# Patient Record
Sex: FEMALE | Race: WHITE | NOT HISPANIC OR LATINO | Employment: FULL TIME | URBAN - METROPOLITAN AREA
[De-identification: names, ages, dates, MRNs, and addresses within clinical notes are randomized per-mention and may not be internally consistent; named-entity substitution may affect disease eponyms.]

---

## 2023-03-29 ENCOUNTER — TELEMEDICINE (OUTPATIENT)
Dept: PRIMARY CARE | Facility: CLINIC | Age: 63
End: 2023-03-29
Payer: COMMERCIAL

## 2023-03-29 DIAGNOSIS — J20.9 ACUTE BRONCHITIS, UNSPECIFIED ORGANISM: Primary | ICD-10-CM

## 2023-03-29 PROBLEM — I10 HYPERTENSION: Status: ACTIVE | Noted: 2023-03-29

## 2023-03-29 PROBLEM — F32.A DEPRESSION: Status: ACTIVE | Noted: 2023-03-29

## 2023-03-29 PROBLEM — M54.12 CERVICAL RADICULOPATHY: Status: ACTIVE | Noted: 2023-03-29

## 2023-03-29 PROBLEM — G56.00 CARPAL TUNNEL SYNDROME: Status: ACTIVE | Noted: 2023-03-29

## 2023-03-29 PROBLEM — G62.9 PERIPHERAL NEUROPATHY: Status: ACTIVE | Noted: 2023-03-29

## 2023-03-29 PROCEDURE — 99212 OFFICE O/P EST SF 10 MIN: CPT | Performed by: NURSE PRACTITIONER

## 2023-03-29 RX ORDER — METHYLPREDNISOLONE 4 MG/1
TABLET ORAL
Qty: 21 TABLET | Refills: 0 | Status: SHIPPED | OUTPATIENT
Start: 2023-03-29 | End: 2023-04-05

## 2023-03-29 RX ORDER — ALBUTEROL SULFATE 90 UG/1
2 AEROSOL, METERED RESPIRATORY (INHALATION) EVERY 4 HOURS PRN
COMMUNITY
End: 2024-02-21

## 2023-03-29 RX ORDER — RIZATRIPTAN BENZOATE 10 MG/1
10 TABLET ORAL ONCE AS NEEDED
COMMUNITY
End: 2023-12-06

## 2023-03-29 RX ORDER — MELOXICAM 15 MG/1
15 TABLET ORAL DAILY
COMMUNITY
End: 2023-04-17

## 2023-03-29 RX ORDER — LISINOPRIL 10 MG/1
10 TABLET ORAL DAILY
COMMUNITY
End: 2023-03-31

## 2023-03-29 RX ORDER — BENZONATATE 200 MG/1
200 CAPSULE ORAL 3 TIMES DAILY PRN
Qty: 42 CAPSULE | Refills: 0 | Status: SHIPPED | OUTPATIENT
Start: 2023-03-29 | End: 2023-04-28

## 2023-03-29 RX ORDER — AZITHROMYCIN 250 MG/1
TABLET, FILM COATED ORAL
Qty: 6 TABLET | Refills: 0 | Status: SHIPPED | OUTPATIENT
Start: 2023-03-29 | End: 2023-04-03

## 2023-03-29 ASSESSMENT — ENCOUNTER SYMPTOMS
SORE THROAT: 0
SINUS PRESSURE: 0
SINUS PAIN: 0
FEVER: 0
SHORTNESS OF BREATH: 1
NAUSEA: 0
ABDOMINAL DISTENTION: 0
RHINORRHEA: 1
CHEST TIGHTNESS: 0
FATIGUE: 0
APPETITE CHANGE: 0
TROUBLE SWALLOWING: 0
PALPITATIONS: 0
VOICE CHANGE: 1
DIARRHEA: 0
COUGH: 1
STRIDOR: 0
WHEEZING: 0

## 2023-03-29 NOTE — PROGRESS NOTES
Subjective   Chief Complaint: Cough (1 week ).    Cough  Associated symptoms include rhinorrhea and shortness of breath. Pertinent negatives include no chest pain, ear pain, fever, postnasal drip, sore throat or wheezing.      Courtney Chiu is a 63 y.o. female who presents for Cough (1 week ).  Cough over the past 1 week which has gotten worse over the past few days. Has been experiencing hoarseness due to the cough. Occasionally productive but mostly dry. Keeps her up at night   Has been using clartin D with no relief. Albuterol inhaler with no relief.   Some sinus drainage  Patient denies fever, chills, nausea, vomiting, diarrhea, chest pain, heart palpations, or shortness of breath.   COVID test negative     Review of Systems   Constitutional:  Negative for appetite change, fatigue and fever.   HENT:  Positive for congestion, rhinorrhea and voice change. Negative for ear discharge, ear pain, postnasal drip, sinus pressure, sinus pain, sore throat and trouble swallowing.    Respiratory:  Positive for cough and shortness of breath. Negative for chest tightness, wheezing and stridor.    Cardiovascular:  Negative for chest pain and palpitations.   Gastrointestinal:  Negative for abdominal distention, diarrhea and nausea.       Objective   There were no vitals taken for this visit.  BSA There is no height or weight on file to calculate BSA.      Physical Exam  No visits with results within 1 Year(s) from this visit.   Latest known visit with results is:   Legacy Encounter on 12/03/2021   Component Date Value Ref Range Status    WBC 12/03/2021 5.5  4.4 - 11.3 x10E9/L Final    nRBC 12/03/2021 0.0  0.0 - 0.0 /100 WBC Final    RBC 12/03/2021 3.97 (L)  4.00 - 5.20 x10E12/L Final    Hemoglobin 12/03/2021 13.7  12.0 - 16.0 g/dL Final    Hematocrit 12/03/2021 41.4  36.0 - 46.0 % Final    MCV 12/03/2021 104 (H)  80 - 100 fL Final    MCHC 12/03/2021 33.1  32.0 - 36.0 g/dL Final    Platelets 12/03/2021 263  150 - 450 x10E9/L  Final    RDW 12/03/2021 14.1  11.5 - 14.5 % Final    Neutrophils % 12/03/2021 67.2  40.0 - 80.0 % Final    Immature Granulocytes %, Automated 12/03/2021 0.6  0.0 - 0.9 % Final    Comment:  Immature Granulocyte Count (IG) includes promyelocytes,    myelocytes and metamyelocytes but does not include bands.   Percent differential counts (%) should be interpreted in the   context of the absolute cell counts (cells/L).      Lymphocytes % 12/03/2021 16.1  13.0 - 44.0 % Final    Monocytes % 12/03/2021 9.7  2.0 - 10.0 % Final    Eosinophils % 12/03/2021 5.3  0.0 - 6.0 % Final    Basophils % 12/03/2021 1.1  0.0 - 2.0 % Final    Neutrophils Absolute 12/03/2021 3.66  1.20 - 7.70 x10E9/L Final    Lymphocytes Absolute 12/03/2021 0.88 (L)  1.20 - 4.80 x10E9/L Final    Monocytes Absolute 12/03/2021 0.53  0.10 - 1.00 x10E9/L Final    Eosinophils Absolute 12/03/2021 0.29  0.00 - 0.70 x10E9/L Final    Basophils Absolute 12/03/2021 0.06  0.00 - 0.10 x10E9/L Final    Folate 12/03/2021 13.7  >5.0 ng/mL Final    Comment: Low           <3.4  Borderline 3.4-5.0  Normal        >5.0  .   Biotin interference may cause falsely elevated results.    Patients taking a Biotin dose of up to 5 mg/day should    refrain from taking Biotin for 24 hours before sample    collection. Providers may contact their local laboratory   for further information.      Cholesterol 12/03/2021 205 (H)  0 - 199 mg/dL Final    Comment: .      AGE      DESIRABLE   BORDERLINE HIGH   HIGH     0-19 Y     0 - 169       170 - 199     >/= 200    20-24 Y     0 - 189       190 - 224     >/= 225         >24 Y     0 - 199       200 - 239     >/= 240   **All ranges are based on fasting samples. Specific   therapeutic targets will vary based on patient-specific   cardiac risk.  .   Pediatric guidelines reference:Pediatrics 2011, 128(S5).   Adult guidelines reference: NCEP ATPIII Guidelines,     NORA 2001, 258:2486-97  .   Venipuncture immediately after or during the     administration of Metamizole may lead to falsely   low results. Testing should be performed immediately   prior to Metamizole dosing.      HDL 12/03/2021 66.6  mg/dL Final    Comment: .      AGE      VERY LOW   LOW     NORMAL    HIGH       0-19 Y       < 35   < 40     40-45     ----    20-24 Y       ----   < 40       >45     ----      >24 Y       ----   < 40     40-60      >60  .      Cholesterol/HDL Ratio 12/03/2021 3.1   Final    Comment: REF VALUES  DESIRABLE  < 3.4  HIGH RISK  > 5.0      LDL 12/03/2021 112 (H)  0 - 99 mg/dL Final    Comment: .                           NEAR      BORD      AGE      DESIRABLE  OPTIMAL    HIGH     HIGH     VERY HIGH     0-19 Y     0 - 109     ---    110-129   >/= 130     ----    20-24 Y     0 - 119     ---    120-159   >/= 160     ----      >24 Y     0 -  99   100-129  130-159   160-189     >/=190  .      VLDL 12/03/2021 27  0 - 40 mg/dL Final    Triglycerides 12/03/2021 133  0 - 149 mg/dL Final    Comment: .      AGE      DESIRABLE   BORDERLINE HIGH   HIGH     VERY HIGH   0 D-90 D    19 - 174         ----         ----        ----  91 D- 9 Y     0 -  74        75 -  99     >/= 100      ----    10-19 Y     0 -  89        90 - 129     >/= 130      ----    20-24 Y     0 - 114       115 - 149     >/= 150      ----         >24 Y     0 - 149       150 - 199    200- 499    >/= 500  .   Venipuncture immediately after or during the    administration of Metamizole may lead to falsely   low results. Testing should be performed immediately   prior to Metamizole dosing.      Vitamin B-12 12/03/2021 467  211 - 911 pg/mL Final    Glucose 12/03/2021 90  74 - 99 mg/dL Final    Sodium 12/03/2021 139  136 - 145 mmol/L Final    Potassium 12/03/2021 4.4  3.5 - 5.3 mmol/L Final    Chloride 12/03/2021 102  98 - 107 mmol/L Final    Bicarbonate 12/03/2021 30  21 - 32 mmol/L Final    Anion Gap 12/03/2021 11  10 - 20 mmol/L Final    Urea Nitrogen 12/03/2021 18  6 - 23 mg/dL Final    Creatinine 12/03/2021  0.71  0.50 - 1.05 mg/dL Final    GLOMERULAR FILTRATION RATE-NON AFR* 12/03/2021 >60  >60 mL/min/1.73m2 Final    GLOMERULAR FILTRATION RATE-* 12/03/2021 >60  >60 mL/min/1.73m2 Final    Comment:  CALCULATIONS OF ESTIMATED GFR ARE PERFORMED   USING THE MDRD STUDY EQUATION FOR THE   IDMS-TRACEABLE CREATININE METHODS.   CLIN CHEM 2007;53:766-72      Calcium 12/03/2021 9.6  8.6 - 10.6 mg/dL Final    Albumin 12/03/2021 4.3  3.4 - 5.0 g/dL Final    Alkaline Phosphatase 12/03/2021 117 (H)  33 - 110 U/L Final    Total Protein 12/03/2021 7.0  6.4 - 8.2 g/dL Final    AST 12/03/2021 24  9 - 39 U/L Final    Total Bilirubin 12/03/2021 0.5  0.0 - 1.2 mg/dL Final    ALT (SGPT) 12/03/2021 29  7 - 45 U/L Final    Comment:  Patients treated with Sulfasalazine may generate    falsely decreased results for ALT.      TSH 12/03/2021 3.32  0.44 - 3.98 mIU/L Final    Comment:  TSH testing is performed using different testing    methodology at [facility] than at other    [facility]. Direct result comparisons should    only be made within the same method.       Current Outpatient Medications on File Prior to Visit   Medication Sig Dispense Refill    albuterol 90 mcg/actuation inhaler Inhale 2 puffs every 4 hours if needed.      lisinopril 10 mg tablet Take 1 tablet (10 mg) by mouth once daily.      meloxicam (Mobic) 15 mg tablet Take 1 tablet (15 mg) by mouth once daily. Take with food.      rizatriptan (Maxalt) 10 mg tablet Take 1 tablet (10 mg) by mouth 1 time if needed for migraine.       No current facility-administered medications on file prior to visit.     No images are attached to the encounter.            Assessment/Plan   Problem List Items Addressed This Visit          Respiratory    Acute bronchitis - Primary     - Antibiotic sent to pharmacy  - continue Albuterol inhaler   - Tessalon perles sent to pharmacy   - Steroid sent to pharmacy  - Advised patient to push fluids   - patient to call if develops new  or worsening symptoms           Relevant Medications    azithromycin (Zithromax) 250 mg tablet    benzonatate (Tessalon) 200 mg capsule    methylPREDNISolone (Medrol Dospak) 4 mg tablets

## 2023-03-29 NOTE — ASSESSMENT & PLAN NOTE
- Antibiotic sent to pharmacy  - continue Albuterol inhaler   - Tessalon perles sent to pharmacy   - Steroid sent to pharmacy  - Advised patient to push fluids   - patient to call if develops new or worsening symptoms

## 2023-04-15 DIAGNOSIS — M19.90 UNSPECIFIED OSTEOARTHRITIS, UNSPECIFIED SITE: ICD-10-CM

## 2023-04-17 RX ORDER — MELOXICAM 15 MG/1
TABLET ORAL
Qty: 90 TABLET | Refills: 0 | Status: SHIPPED | OUTPATIENT
Start: 2023-04-17 | End: 2023-06-28 | Stop reason: ALTCHOICE

## 2023-06-21 ENCOUNTER — OFFICE VISIT (OUTPATIENT)
Dept: PRIMARY CARE | Facility: CLINIC | Age: 63
End: 2023-06-21
Payer: COMMERCIAL

## 2023-06-21 VITALS
SYSTOLIC BLOOD PRESSURE: 116 MMHG | BODY MASS INDEX: 43.99 KG/M2 | OXYGEN SATURATION: 93 % | WEIGHT: 264.38 LBS | HEART RATE: 91 BPM | DIASTOLIC BLOOD PRESSURE: 81 MMHG

## 2023-06-21 DIAGNOSIS — M25.551 RIGHT HIP PAIN: Primary | ICD-10-CM

## 2023-06-21 DIAGNOSIS — S76.011A STRAIN OF MUSCLE OF RIGHT HIP, INITIAL ENCOUNTER: ICD-10-CM

## 2023-06-21 PROCEDURE — 3074F SYST BP LT 130 MM HG: CPT | Performed by: NURSE PRACTITIONER

## 2023-06-21 PROCEDURE — 3079F DIAST BP 80-89 MM HG: CPT | Performed by: NURSE PRACTITIONER

## 2023-06-21 PROCEDURE — 99213 OFFICE O/P EST LOW 20 MIN: CPT | Performed by: NURSE PRACTITIONER

## 2023-06-21 PROCEDURE — 1036F TOBACCO NON-USER: CPT | Performed by: NURSE PRACTITIONER

## 2023-06-21 RX ORDER — CYCLOBENZAPRINE HCL 5 MG
5 TABLET ORAL NIGHTLY PRN
Qty: 30 TABLET | Refills: 0 | Status: SHIPPED | OUTPATIENT
Start: 2023-06-21 | End: 2023-09-28

## 2023-06-21 ASSESSMENT — ENCOUNTER SYMPTOMS
FEVER: 0
VOMITING: 0
CHILLS: 0
NAUSEA: 0
COUGH: 0
ABDOMINAL PAIN: 0
SHORTNESS OF BREATH: 0
DIARRHEA: 0

## 2023-06-21 ASSESSMENT — PAIN SCALES - GENERAL: PAINLEVEL: 6

## 2023-06-21 NOTE — PATIENT INSTRUCTIONS
Obtain xray of your hip as ordered  May use Flexeril at nighttime   May continue to use Meloxicam and tyenol for pain  Please call if your symptoms worsen or do not improve

## 2023-06-21 NOTE — PROGRESS NOTES
Subjective   Chief Complaint: Back Pain and Hip Pain (Right. 1 week).    HPI   Courtney Chiu is a 63 y.o. female who presents for Back Pain and Hip Pain (Right. 1 week).    Patient presents with right hip pain that started 1 week ago,   She has had this in the past but never this bad or lasted this long.   Pain is worse when walking and moving   She reports that the pain is her hip and radiates to part of her thigh. She reports this feels different than sciatic pain she has had in the past.     She tried taking tylenol with minimal relief.   She is currently on Meloxicam     Patient denies fever, chills, nausea, vomiting, diarrhea, chest pain, heart palpations, or shortness of breath.       Review of Systems   Constitutional:  Negative for chills and fever.   Respiratory:  Negative for cough and shortness of breath.    Cardiovascular:  Negative for chest pain.   Gastrointestinal:  Negative for abdominal pain, diarrhea, nausea and vomiting.   Musculoskeletal:         Right hip pain        Objective   /81   Pulse 91   Wt 120 kg (264 lb 6 oz)   SpO2 93%   BMI 43.99 kg/m²   BSA Body surface area is 2.35 meters squared.      Physical Exam  Constitutional:       Appearance: Normal appearance.   Musculoskeletal:         General: No swelling, tenderness, deformity or signs of injury.      Comments: Right hip pain    Neurological:      Mental Status: She is alert.       No visits with results within 1 Year(s) from this visit.   Latest known visit with results is:   Legacy Encounter on 12/03/2021   Component Date Value Ref Range Status    WBC 12/03/2021 5.5  4.4 - 11.3 x10E9/L Final    nRBC 12/03/2021 0.0  0.0 - 0.0 /100 WBC Final    RBC 12/03/2021 3.97 (L)  4.00 - 5.20 x10E12/L Final    Hemoglobin 12/03/2021 13.7  12.0 - 16.0 g/dL Final    Hematocrit 12/03/2021 41.4  36.0 - 46.0 % Final    MCV 12/03/2021 104 (H)  80 - 100 fL Final    MCHC 12/03/2021 33.1  32.0 - 36.0 g/dL Final    Platelets 12/03/2021 263  150  - 450 x10E9/L Final    RDW 12/03/2021 14.1  11.5 - 14.5 % Final    Neutrophils % 12/03/2021 67.2  40.0 - 80.0 % Final    Immature Granulocytes %, Automated 12/03/2021 0.6  0.0 - 0.9 % Final    Comment:  Immature Granulocyte Count (IG) includes promyelocytes,    myelocytes and metamyelocytes but does not include bands.   Percent differential counts (%) should be interpreted in the   context of the absolute cell counts (cells/L).      Lymphocytes % 12/03/2021 16.1  13.0 - 44.0 % Final    Monocytes % 12/03/2021 9.7  2.0 - 10.0 % Final    Eosinophils % 12/03/2021 5.3  0.0 - 6.0 % Final    Basophils % 12/03/2021 1.1  0.0 - 2.0 % Final    Neutrophils Absolute 12/03/2021 3.66  1.20 - 7.70 x10E9/L Final    Lymphocytes Absolute 12/03/2021 0.88 (L)  1.20 - 4.80 x10E9/L Final    Monocytes Absolute 12/03/2021 0.53  0.10 - 1.00 x10E9/L Final    Eosinophils Absolute 12/03/2021 0.29  0.00 - 0.70 x10E9/L Final    Basophils Absolute 12/03/2021 0.06  0.00 - 0.10 x10E9/L Final    Folate 12/03/2021 13.7  >5.0 ng/mL Final    Comment: Low           <3.4  Borderline 3.4-5.0  Normal        >5.0  .   Biotin interference may cause falsely elevated results.    Patients taking a Biotin dose of up to 5 mg/day should    refrain from taking Biotin for 24 hours before sample    collection. Providers may contact their local laboratory   for further information.      Cholesterol 12/03/2021 205 (H)  0 - 199 mg/dL Final    Comment: .      AGE      DESIRABLE   BORDERLINE HIGH   HIGH     0-19 Y     0 - 169       170 - 199     >/= 200    20-24 Y     0 - 189       190 - 224     >/= 225         >24 Y     0 - 199       200 - 239     >/= 240   **All ranges are based on fasting samples. Specific   therapeutic targets will vary based on patient-specific   cardiac risk.  .   Pediatric guidelines reference:Pediatrics 2011, 128(S5).   Adult guidelines reference: NCEP ATPIII Guidelines,     NORA 2001, 258:2486-97  .   Venipuncture immediately after or during the     administration of Metamizole may lead to falsely   low results. Testing should be performed immediately   prior to Metamizole dosing.      HDL 12/03/2021 66.6  mg/dL Final    Comment: .      AGE      VERY LOW   LOW     NORMAL    HIGH       0-19 Y       < 35   < 40     40-45     ----    20-24 Y       ----   < 40       >45     ----      >24 Y       ----   < 40     40-60      >60  .      Cholesterol/HDL Ratio 12/03/2021 3.1   Final    Comment: REF VALUES  DESIRABLE  < 3.4  HIGH RISK  > 5.0      LDL 12/03/2021 112 (H)  0 - 99 mg/dL Final    Comment: .                           NEAR      BORD      AGE      DESIRABLE  OPTIMAL    HIGH     HIGH     VERY HIGH     0-19 Y     0 - 109     ---    110-129   >/= 130     ----    20-24 Y     0 - 119     ---    120-159   >/= 160     ----      >24 Y     0 -  99   100-129  130-159   160-189     >/=190  .      VLDL 12/03/2021 27  0 - 40 mg/dL Final    Triglycerides 12/03/2021 133  0 - 149 mg/dL Final    Comment: .      AGE      DESIRABLE   BORDERLINE HIGH   HIGH     VERY HIGH   0 D-90 D    19 - 174         ----         ----        ----  91 D- 9 Y     0 -  74        75 -  99     >/= 100      ----    10-19 Y     0 -  89        90 - 129     >/= 130      ----    20-24 Y     0 - 114       115 - 149     >/= 150      ----         >24 Y     0 - 149       150 - 199    200- 499    >/= 500  .   Venipuncture immediately after or during the    administration of Metamizole may lead to falsely   low results. Testing should be performed immediately   prior to Metamizole dosing.      Vitamin B-12 12/03/2021 467  211 - 911 pg/mL Final    Glucose 12/03/2021 90  74 - 99 mg/dL Final    Sodium 12/03/2021 139  136 - 145 mmol/L Final    Potassium 12/03/2021 4.4  3.5 - 5.3 mmol/L Final    Chloride 12/03/2021 102  98 - 107 mmol/L Final    Bicarbonate 12/03/2021 30  21 - 32 mmol/L Final    Anion Gap 12/03/2021 11  10 - 20 mmol/L Final    Urea Nitrogen 12/03/2021 18  6 - 23 mg/dL Final    Creatinine 12/03/2021  0.71  0.50 - 1.05 mg/dL Final    GLOMERULAR FILTRATION RATE-NON AFR* 12/03/2021 >60  >60 mL/min/1.73m2 Final    GLOMERULAR FILTRATION RATE-* 12/03/2021 >60  >60 mL/min/1.73m2 Final    Comment:  CALCULATIONS OF ESTIMATED GFR ARE PERFORMED   USING THE MDRD STUDY EQUATION FOR THE   IDMS-TRACEABLE CREATININE METHODS.   CLIN CHEM 2007;53:766-72      Calcium 12/03/2021 9.6  8.6 - 10.6 mg/dL Final    Albumin 12/03/2021 4.3  3.4 - 5.0 g/dL Final    Alkaline Phosphatase 12/03/2021 117 (H)  33 - 110 U/L Final    Total Protein 12/03/2021 7.0  6.4 - 8.2 g/dL Final    AST 12/03/2021 24  9 - 39 U/L Final    Total Bilirubin 12/03/2021 0.5  0.0 - 1.2 mg/dL Final    ALT (SGPT) 12/03/2021 29  7 - 45 U/L Final    Comment:  Patients treated with Sulfasalazine may generate    falsely decreased results for ALT.      TSH 12/03/2021 3.32  0.44 - 3.98 mIU/L Final    Comment:  TSH testing is performed using different testing    methodology at [facility] than at other    [facility] hospitals. Direct result comparisons should    only be made within the same method.       Current Outpatient Medications on File Prior to Visit   Medication Sig Dispense Refill    albuterol 90 mcg/actuation inhaler Inhale 2 puffs every 4 hours if needed.      lisinopril 10 mg tablet Take 1 tablet (10 mg) by mouth once daily. 90 tablet 0    meloxicam (Mobic) 15 mg tablet TAKE 1 TABLET BY MOUTH EVERY DAY WITH FOOD 90 tablet 0    rizatriptan (Maxalt) 10 mg tablet Take 1 tablet (10 mg) by mouth 1 time if needed for migraine.       No current facility-administered medications on file prior to visit.     No images are attached to the encounter.            Assessment/Plan   Problem List Items Addressed This Visit       Right hip pain - Primary     Xray ordered           Other Visit Diagnoses       Strain of muscle of right hip, initial encounter        Relevant Medications    cyclobenzaprine (Flexeril) 5 mg tablet

## 2023-06-26 DIAGNOSIS — I10 ESSENTIAL (PRIMARY) HYPERTENSION: ICD-10-CM

## 2023-06-26 RX ORDER — LISINOPRIL 10 MG/1
TABLET ORAL
Qty: 90 TABLET | Refills: 0 | Status: SHIPPED | OUTPATIENT
Start: 2023-06-26 | End: 2023-09-28

## 2023-06-28 ENCOUNTER — OFFICE VISIT (OUTPATIENT)
Dept: PRIMARY CARE | Facility: CLINIC | Age: 63
End: 2023-06-28
Payer: COMMERCIAL

## 2023-06-28 VITALS
WEIGHT: 265 LBS | BODY MASS INDEX: 44.15 KG/M2 | HEIGHT: 65 IN | SYSTOLIC BLOOD PRESSURE: 128 MMHG | DIASTOLIC BLOOD PRESSURE: 88 MMHG | HEART RATE: 95 BPM | RESPIRATION RATE: 18 BRPM | OXYGEN SATURATION: 94 %

## 2023-06-28 DIAGNOSIS — M25.551 RIGHT HIP PAIN: Primary | ICD-10-CM

## 2023-06-28 DIAGNOSIS — G56.03 BILATERAL CARPAL TUNNEL SYNDROME: ICD-10-CM

## 2023-06-28 PROCEDURE — 1036F TOBACCO NON-USER: CPT | Performed by: FAMILY MEDICINE

## 2023-06-28 PROCEDURE — 99213 OFFICE O/P EST LOW 20 MIN: CPT | Performed by: FAMILY MEDICINE

## 2023-06-28 PROCEDURE — 3079F DIAST BP 80-89 MM HG: CPT | Performed by: FAMILY MEDICINE

## 2023-06-28 PROCEDURE — 3074F SYST BP LT 130 MM HG: CPT | Performed by: FAMILY MEDICINE

## 2023-06-28 RX ORDER — METHYLPREDNISOLONE 4 MG/1
TABLET ORAL
Qty: 21 TABLET | Refills: 0 | Status: SHIPPED | OUTPATIENT
Start: 2023-06-28 | End: 2023-07-05

## 2023-06-28 ASSESSMENT — ENCOUNTER SYMPTOMS
MYALGIAS: 0
FATIGUE: 0
ENDOCRINE NEGATIVE: 1
UNEXPECTED WEIGHT CHANGE: 0
DIZZINESS: 1
HIP PAIN: 1
ARTHRALGIAS: 1
RESPIRATORY NEGATIVE: 1
DIAPHORESIS: 0

## 2023-06-28 ASSESSMENT — PAIN SCALES - GENERAL: PAINLEVEL: 6

## 2023-06-28 NOTE — PATIENT INSTRUCTIONS
Evaluating for persistent hip pain.    Ordering MRI of the hip.    Starting Medrol Dosepak.    Please hold meloxicam at this time.    ER if unable to bear weight on this area.

## 2023-06-28 NOTE — PROGRESS NOTES
"Subjective   Patient ID: Courtney Chiu is a 63 y.o. female who presents for Hip Pain (Pain not improving - bruise developed around hip line).    Hip Pain      patient presents with hip pain.  Patient having excruciating pain discomfort in the right hip.  Very difficult time walking on the area.  Pain is into the groin area.  Patient did have x-ray performed no evidence of fracture was appreciated.    Patient now noticed a large area of bruising in the posterior aspect of the hip area.    Pain is not shooting down below the knee no numbness no tingling into the foot.    States that after walking 10 steps its excruciating pain and can barely proceed moving forward.    Patient's been taking anti-inflammatory muscle relaxant which provides very minimal disc comfort relief.    Wednesday worse.    Limping    Last weekend started.  Some bother.    Review of Systems   Constitutional:  Negative for diaphoresis, fatigue and unexpected weight change.   Respiratory: Negative.     Endocrine: Negative.    Musculoskeletal:  Positive for arthralgias. Negative for myalgias.   Skin:  Positive for rash.   Neurological:  Positive for dizziness.       Objective   /88 (BP Location: Right arm, Patient Position: Sitting, BP Cuff Size: Large adult)   Pulse 95   Resp 18   Ht 1.651 m (5' 5\")   Wt 120 kg (265 lb)   SpO2 94%   BMI 44.10 kg/m²   BSA Body surface area is 2.35 meters squared.      Physical Exam  Constitutional:       Appearance: Normal appearance.   Eyes:      Pupils: Pupils are equal, round, and reactive to light.   Cardiovascular:      Rate and Rhythm: Normal rate and regular rhythm.      Pulses: Normal pulses.      Heart sounds: Normal heart sounds.   Pulmonary:      Effort: Pulmonary effort is normal.   Neurological:      Mental Status: She is alert.     Patient having difficult time bearing weight on the right hip.    Some pain with flexion of the hip also with internal and external rotation.  No leg length " discrepancy is noted.    There is area of ecchymosis posterior aspect of the greater trochanteric area on the right side.    No evidence of hematoma noted.    Patient once again denies any trauma to the area there was no sudden movement.  No visits with results within 1 Year(s) from this visit.   Latest known visit with results is:   [facility] Encounter on 12/03/2021   Component Date Value Ref Range Status    WBC 12/03/2021 5.5  4.4 - 11.3 x10E9/L Final    nRBC 12/03/2021 0.0  0.0 - 0.0 /100 WBC Final    RBC 12/03/2021 3.97 (L)  4.00 - 5.20 x10E12/L Final    Hemoglobin 12/03/2021 13.7  12.0 - 16.0 g/dL Final    Hematocrit 12/03/2021 41.4  36.0 - 46.0 % Final    MCV 12/03/2021 104 (H)  80 - 100 fL Final    MCHC 12/03/2021 33.1  32.0 - 36.0 g/dL Final    Platelets 12/03/2021 263  150 - 450 x10E9/L Final    RDW 12/03/2021 14.1  11.5 - 14.5 % Final    Neutrophils % 12/03/2021 67.2  40.0 - 80.0 % Final    Immature Granulocytes %, Automated 12/03/2021 0.6  0.0 - 0.9 % Final    Comment:  Immature Granulocyte Count (IG) includes promyelocytes,    myelocytes and metamyelocytes but does not include bands.   Percent differential counts (%) should be interpreted in the   context of the absolute cell counts (cells/L).      Lymphocytes % 12/03/2021 16.1  13.0 - 44.0 % Final    Monocytes % 12/03/2021 9.7  2.0 - 10.0 % Final    Eosinophils % 12/03/2021 5.3  0.0 - 6.0 % Final    Basophils % 12/03/2021 1.1  0.0 - 2.0 % Final    Neutrophils Absolute 12/03/2021 3.66  1.20 - 7.70 x10E9/L Final    Lymphocytes Absolute 12/03/2021 0.88 (L)  1.20 - 4.80 x10E9/L Final    Monocytes Absolute 12/03/2021 0.53  0.10 - 1.00 x10E9/L Final    Eosinophils Absolute 12/03/2021 0.29  0.00 - 0.70 x10E9/L Final    Basophils Absolute 12/03/2021 0.06  0.00 - 0.10 x10E9/L Final    Folate 12/03/2021 13.7  >5.0 ng/mL Final    Comment: Low           <3.4  Borderline 3.4-5.0  Normal        >5.0  .   Biotin interference may cause falsely elevated results.     Patients taking a Biotin dose of up to 5 mg/day should    refrain from taking Biotin for 24 hours before sample    collection. Providers may contact their local laboratory   for further information.      Cholesterol 12/03/2021 205 (H)  0 - 199 mg/dL Final    Comment: .      AGE      DESIRABLE   BORDERLINE HIGH   HIGH     0-19 Y     0 - 169       170 - 199     >/= 200    20-24 Y     0 - 189       190 - 224     >/= 225         >24 Y     0 - 199       200 - 239     >/= 240   **All ranges are based on fasting samples. Specific   therapeutic targets will vary based on patient-specific   cardiac risk.  .   Pediatric guidelines reference:Pediatrics 2011, 128(S5).   Adult guidelines reference: NCEP ATPIII Guidelines,     NORA 2001, 258:2486-97  .   Venipuncture immediately after or during the    administration of Metamizole may lead to falsely   low results. Testing should be performed immediately   prior to Metamizole dosing.      HDL 12/03/2021 66.6  mg/dL Final    Comment: .      AGE      VERY LOW   LOW     NORMAL    HIGH       0-19 Y       < 35   < 40     40-45     ----    20-24 Y       ----   < 40       >45     ----      >24 Y       ----   < 40     40-60      >60  .      Cholesterol/HDL Ratio 12/03/2021 3.1   Final    Comment: REF VALUES  DESIRABLE  < 3.4  HIGH RISK  > 5.0      LDL 12/03/2021 112 (H)  0 - 99 mg/dL Final    Comment: .                           NEAR      BORD      AGE      DESIRABLE  OPTIMAL    HIGH     HIGH     VERY HIGH     0-19 Y     0 - 109     ---    110-129   >/= 130     ----    20-24 Y     0 - 119     ---    120-159   >/= 160     ----      >24 Y     0 -  99   100-129  130-159   160-189     >/=190  .      VLDL 12/03/2021 27  0 - 40 mg/dL Final    Triglycerides 12/03/2021 133  0 - 149 mg/dL Final    Comment: .      AGE      DESIRABLE   BORDERLINE HIGH   HIGH     VERY HIGH   0 D-90 D    19 - 174         ----         ----        ----  91 D- 9 Y     0 -  74        75 -  99     >/= 100      ----     10-19 Y     0 -  89        90 - 129     >/= 130      ----    20-24 Y     0 - 114       115 - 149     >/= 150      ----         >24 Y     0 - 149       150 - 199    200- 499    >/= 500  .   Venipuncture immediately after or during the    administration of Metamizole may lead to falsely   low results. Testing should be performed immediately   prior to Metamizole dosing.      Vitamin B-12 12/03/2021 467  211 - 911 pg/mL Final    Glucose 12/03/2021 90  74 - 99 mg/dL Final    Sodium 12/03/2021 139  136 - 145 mmol/L Final    Potassium 12/03/2021 4.4  3.5 - 5.3 mmol/L Final    Chloride 12/03/2021 102  98 - 107 mmol/L Final    Bicarbonate 12/03/2021 30  21 - 32 mmol/L Final    Anion Gap 12/03/2021 11  10 - 20 mmol/L Final    Urea Nitrogen 12/03/2021 18  6 - 23 mg/dL Final    Creatinine 12/03/2021 0.71  0.50 - 1.05 mg/dL Final    GLOMERULAR FILTRATION RATE-NON AFR* 12/03/2021 >60  >60 mL/min/1.73m2 Final    GLOMERULAR FILTRATION RATE-* 12/03/2021 >60  >60 mL/min/1.73m2 Final    Comment:  CALCULATIONS OF ESTIMATED GFR ARE PERFORMED   USING THE MDRD STUDY EQUATION FOR THE   IDMS-TRACEABLE CREATININE METHODS.   CLIN CHEM 2007;53:766-72      Calcium 12/03/2021 9.6  8.6 - 10.6 mg/dL Final    Albumin 12/03/2021 4.3  3.4 - 5.0 g/dL Final    Alkaline Phosphatase 12/03/2021 117 (H)  33 - 110 U/L Final    Total Protein 12/03/2021 7.0  6.4 - 8.2 g/dL Final    AST 12/03/2021 24  9 - 39 U/L Final    Total Bilirubin 12/03/2021 0.5  0.0 - 1.2 mg/dL Final    ALT (SGPT) 12/03/2021 29  7 - 45 U/L Final    Comment:  Patients treated with Sulfasalazine may generate    falsely decreased results for ALT.      TSH 12/03/2021 3.32  0.44 - 3.98 mIU/L Final    Comment:  TSH testing is performed using different testing    methodology at [facility] than at other    [facility]. Direct result comparisons should    only be made within the same method.       Current Outpatient Medications on File Prior to Visit   Medication Sig  Dispense Refill    albuterol 90 mcg/actuation inhaler Inhale 2 puffs every 4 hours if needed.      cyclobenzaprine (Flexeril) 5 mg tablet Take 1 tablet (5 mg) by mouth as needed at bedtime for muscle spasms. 30 tablet 0    lisinopril 10 mg tablet TAKE 1 TABLET BY MOUTH EVERY DAY 90 tablet 0    meloxicam (Mobic) 15 mg tablet TAKE 1 TABLET BY MOUTH EVERY DAY WITH FOOD 90 tablet 0    rizatriptan (Maxalt) 10 mg tablet Take 1 tablet (10 mg) by mouth 1 time if needed for migraine.      [DISCONTINUED] lisinopril 10 mg tablet Take 1 tablet (10 mg) by mouth once daily. 90 tablet 0     No current facility-administered medications on file prior to visit.     No images are attached to the encounter.            Assessment/Plan   Problem List Items Addressed This Visit       Right hip pain - Primary    Relevant Medications    methylPREDNISolone (Medrol Dospak) 4 mg tablets

## 2023-06-29 ENCOUNTER — TELEPHONE (OUTPATIENT)
Dept: PRIMARY CARE | Facility: CLINIC | Age: 63
End: 2023-06-29
Payer: COMMERCIAL

## 2023-06-29 NOTE — TELEPHONE ENCOUNTER
Makayla from [employer] called asking for PA for pt's stat MRI. Please call her back.  Thank you

## 2023-06-29 NOTE — TELEPHONE ENCOUNTER
Please call Makayla and let her know that Dr. Tenorio has a peer to peer scheduled at 12:45 today for this.

## 2023-06-30 DIAGNOSIS — R59.9 LYMPH NODE ENLARGEMENT: ICD-10-CM

## 2023-07-27 DIAGNOSIS — M25.551 RIGHT HIP PAIN: Primary | ICD-10-CM

## 2023-07-27 RX ORDER — MELOXICAM 15 MG/1
15 TABLET ORAL DAILY
Qty: 90 TABLET | Refills: 0 | Status: SHIPPED | OUTPATIENT
Start: 2023-07-27 | End: 2023-10-24

## 2023-09-27 DIAGNOSIS — S76.011A STRAIN OF MUSCLE OF RIGHT HIP, INITIAL ENCOUNTER: ICD-10-CM

## 2023-09-27 DIAGNOSIS — I10 ESSENTIAL (PRIMARY) HYPERTENSION: ICD-10-CM

## 2023-09-28 RX ORDER — LISINOPRIL 10 MG/1
TABLET ORAL
Qty: 90 TABLET | Refills: 0 | Status: SHIPPED | OUTPATIENT
Start: 2023-09-28 | End: 2023-12-28

## 2023-09-28 RX ORDER — CYCLOBENZAPRINE HCL 5 MG
5 TABLET ORAL NIGHTLY PRN
Qty: 30 TABLET | Refills: 0 | Status: SHIPPED | OUTPATIENT
Start: 2023-09-28 | End: 2024-01-16

## 2023-10-24 DIAGNOSIS — M25.551 RIGHT HIP PAIN: ICD-10-CM

## 2023-10-24 RX ORDER — MELOXICAM 15 MG/1
15 TABLET ORAL DAILY
Qty: 90 TABLET | Refills: 0 | Status: SHIPPED | OUTPATIENT
Start: 2023-10-24 | End: 2024-01-16

## 2023-12-06 DIAGNOSIS — G43.909 MIGRAINE, UNSPECIFIED, NOT INTRACTABLE, WITHOUT STATUS MIGRAINOSUS: ICD-10-CM

## 2023-12-06 RX ORDER — RIZATRIPTAN BENZOATE 10 MG/1
TABLET ORAL
Qty: 12 TABLET | Refills: 0 | Status: SHIPPED | OUTPATIENT
Start: 2023-12-06 | End: 2024-01-03

## 2023-12-11 ENCOUNTER — TELEPHONE (OUTPATIENT)
Dept: PRIMARY CARE | Facility: CLINIC | Age: 63
End: 2023-12-11
Payer: COMMERCIAL

## 2023-12-11 DIAGNOSIS — R59.9 SWELLING OF LYMPH NODES: ICD-10-CM

## 2023-12-11 NOTE — TELEPHONE ENCOUNTER
[facility] calling about patients CT that was ordered on 6/30/2023. They said that the information provided warrants a CT ab/pelvis with contrast, instead of without. How would you like to proceed?

## 2023-12-12 NOTE — TELEPHONE ENCOUNTER
Pt informed. She would like to go to [facility] and an order was faxed there. Pt will go downstairs for the blood test.

## 2023-12-12 NOTE — TELEPHONE ENCOUNTER
Ct abd pelvis approved through 2/4/2024. Pt needs to have BMP done before CT is done. Please notify patient.    Chris Galvan CMA  12/12/2023  Practice Supervisor  [facility]

## 2023-12-28 DIAGNOSIS — I10 ESSENTIAL (PRIMARY) HYPERTENSION: ICD-10-CM

## 2023-12-28 RX ORDER — LISINOPRIL 10 MG/1
TABLET ORAL
Qty: 90 TABLET | Refills: 0 | Status: SHIPPED | OUTPATIENT
Start: 2023-12-28 | End: 2024-03-29

## 2024-01-03 DIAGNOSIS — G43.909 MIGRAINE, UNSPECIFIED, NOT INTRACTABLE, WITHOUT STATUS MIGRAINOSUS: ICD-10-CM

## 2024-01-03 RX ORDER — RIZATRIPTAN BENZOATE 10 MG/1
TABLET ORAL
Qty: 12 TABLET | Refills: 1 | Status: SHIPPED | OUTPATIENT
Start: 2024-01-03 | End: 2024-03-20

## 2024-01-16 DIAGNOSIS — M25.551 RIGHT HIP PAIN: ICD-10-CM

## 2024-01-16 DIAGNOSIS — S76.011A STRAIN OF MUSCLE OF RIGHT HIP, INITIAL ENCOUNTER: ICD-10-CM

## 2024-01-16 RX ORDER — CYCLOBENZAPRINE HCL 5 MG
5 TABLET ORAL NIGHTLY PRN
Qty: 30 TABLET | Refills: 0 | Status: SHIPPED | OUTPATIENT
Start: 2024-01-16 | End: 2024-03-04

## 2024-01-16 RX ORDER — MELOXICAM 15 MG/1
15 TABLET ORAL DAILY
Qty: 90 TABLET | Refills: 0 | Status: SHIPPED | OUTPATIENT
Start: 2024-01-16 | End: 2024-04-18

## 2024-02-06 ENCOUNTER — LAB (OUTPATIENT)
Dept: LAB | Facility: LAB | Age: 64
End: 2024-02-06
Payer: COMMERCIAL

## 2024-02-06 DIAGNOSIS — R59.9 SWELLING OF LYMPH NODES: ICD-10-CM

## 2024-02-06 PROCEDURE — 80048 BASIC METABOLIC PNL TOTAL CA: CPT

## 2024-02-06 PROCEDURE — 36415 COLL VENOUS BLD VENIPUNCTURE: CPT

## 2024-02-07 LAB
ANION GAP SERPL CALC-SCNC: 13 MMOL/L (ref 10–20)
BUN SERPL-MCNC: 21 MG/DL (ref 6–23)
CALCIUM SERPL-MCNC: 9.4 MG/DL (ref 8.6–10.6)
CHLORIDE SERPL-SCNC: 103 MMOL/L (ref 98–107)
CO2 SERPL-SCNC: 28 MMOL/L (ref 21–32)
CREAT SERPL-MCNC: 0.8 MG/DL (ref 0.5–1.05)
EGFRCR SERPLBLD CKD-EPI 2021: 84 ML/MIN/1.73M*2
GLUCOSE SERPL-MCNC: 100 MG/DL (ref 74–99)
POTASSIUM SERPL-SCNC: 4.1 MMOL/L (ref 3.5–5.3)
SODIUM SERPL-SCNC: 140 MMOL/L (ref 136–145)

## 2024-02-09 DIAGNOSIS — Z91.041 ALLERGY TO INTRAVENOUS CONTRAST: Primary | ICD-10-CM

## 2024-02-09 RX ORDER — PREDNISONE 50 MG/1
TABLET ORAL
Qty: 3 TABLET | Refills: 0 | Status: SHIPPED | OUTPATIENT
Start: 2024-02-09

## 2024-02-14 ENCOUNTER — HOSPITAL ENCOUNTER (OUTPATIENT)
Dept: RADIOLOGY | Facility: CLINIC | Age: 64
Discharge: HOME | End: 2024-02-14
Payer: COMMERCIAL

## 2024-02-14 DIAGNOSIS — R59.9 SWELLING OF LYMPH NODES: ICD-10-CM

## 2024-02-14 PROCEDURE — 2550000001 HC RX 255 CONTRASTS: Performed by: FAMILY MEDICINE

## 2024-02-14 PROCEDURE — 74177 CT ABD & PELVIS W/CONTRAST: CPT

## 2024-02-14 PROCEDURE — 74177 CT ABD & PELVIS W/CONTRAST: CPT | Performed by: RADIOLOGY

## 2024-02-14 RX ADMIN — IOHEXOL 75 ML: 350 INJECTION, SOLUTION INTRAVENOUS at 09:04

## 2024-02-21 DIAGNOSIS — J40 BRONCHITIS, NOT SPECIFIED AS ACUTE OR CHRONIC: ICD-10-CM

## 2024-02-21 RX ORDER — ALBUTEROL SULFATE 90 UG/1
AEROSOL, METERED RESPIRATORY (INHALATION)
Qty: 6.7 G | Refills: 0 | Status: SHIPPED | OUTPATIENT
Start: 2024-02-21 | End: 2024-03-20

## 2024-02-27 ENCOUNTER — OFFICE VISIT (OUTPATIENT)
Dept: PRIMARY CARE | Facility: CLINIC | Age: 64
End: 2024-02-27
Payer: COMMERCIAL

## 2024-02-27 VITALS
BODY MASS INDEX: 44.98 KG/M2 | HEART RATE: 92 BPM | SYSTOLIC BLOOD PRESSURE: 126 MMHG | TEMPERATURE: 97 F | HEIGHT: 65 IN | OXYGEN SATURATION: 95 % | WEIGHT: 270 LBS | DIASTOLIC BLOOD PRESSURE: 82 MMHG

## 2024-02-27 DIAGNOSIS — R59.1 LYMPHADENOPATHY: ICD-10-CM

## 2024-02-27 DIAGNOSIS — M17.0 PRIMARY OSTEOARTHRITIS OF BOTH KNEES: ICD-10-CM

## 2024-02-27 DIAGNOSIS — M25.551 RIGHT HIP PAIN: ICD-10-CM

## 2024-02-27 DIAGNOSIS — R71.8 ELEVATED MCV: ICD-10-CM

## 2024-02-27 DIAGNOSIS — M25.50 ARTHRALGIA, UNSPECIFIED JOINT: Primary | ICD-10-CM

## 2024-02-27 DIAGNOSIS — E66.01 CLASS 3 SEVERE OBESITY WITH SERIOUS COMORBIDITY AND BODY MASS INDEX (BMI) OF 40.0 TO 44.9 IN ADULT, UNSPECIFIED OBESITY TYPE (MULTI): ICD-10-CM

## 2024-02-27 PROBLEM — E66.9 OBESITY: Status: ACTIVE | Noted: 2024-02-27

## 2024-02-27 PROBLEM — J20.9 ACUTE BRONCHITIS: Status: RESOLVED | Noted: 2023-03-29 | Resolved: 2024-02-27

## 2024-02-27 PROCEDURE — 3074F SYST BP LT 130 MM HG: CPT | Performed by: FAMILY MEDICINE

## 2024-02-27 PROCEDURE — 3079F DIAST BP 80-89 MM HG: CPT | Performed by: FAMILY MEDICINE

## 2024-02-27 PROCEDURE — 1036F TOBACCO NON-USER: CPT | Performed by: FAMILY MEDICINE

## 2024-02-27 PROCEDURE — 3008F BODY MASS INDEX DOCD: CPT | Performed by: FAMILY MEDICINE

## 2024-02-27 PROCEDURE — 99214 OFFICE O/P EST MOD 30 MIN: CPT | Performed by: FAMILY MEDICINE

## 2024-02-27 ASSESSMENT — ENCOUNTER SYMPTOMS
CHILLS: 0
OCCASIONAL FEELINGS OF UNSTEADINESS: 0
FATIGUE: 0
WHEEZING: 0
EYE ITCHING: 0
CHEST TIGHTNESS: 0
LOSS OF SENSATION IN FEET: 0
FEVER: 0
DEPRESSION: 0
ACTIVITY CHANGE: 0
COUGH: 0
EYE REDNESS: 0

## 2024-02-27 ASSESSMENT — PATIENT HEALTH QUESTIONNAIRE - PHQ9
2. FEELING DOWN, DEPRESSED OR HOPELESS: NOT AT ALL
SUM OF ALL RESPONSES TO PHQ9 QUESTIONS 1 AND 2: 0
1. LITTLE INTEREST OR PLEASURE IN DOING THINGS: NOT AT ALL
10. IF YOU CHECKED OFF ANY PROBLEMS, HOW DIFFICULT HAVE THESE PROBLEMS MADE IT FOR YOU TO DO YOUR WORK, TAKE CARE OF THINGS AT HOME, OR GET ALONG WITH OTHER PEOPLE: NOT DIFFICULT AT ALL

## 2024-02-27 NOTE — PATIENT INSTRUCTIONS
Lab studies are being performed.    Working up for arthralgias.    Going to have you see hematology oncology specialist because of the borderline lymphadenopathy    LDH CBC and sed rate and C-reactive protein being performed.    Awaiting studies may reach out to pharmacy staff to see if there is medicine that could help with the weight loss.

## 2024-02-27 NOTE — ASSESSMENT & PLAN NOTE
Borderline lymphadenopathy going to have you see the hematology specialist.    Sed rate C-reactive protein go to be performed CMP CBC LDH going to be performed.  Because of the arthralgias and a rheumatoid factor being performed

## 2024-02-27 NOTE — PROGRESS NOTES
"Subjective   Patient ID: Courtney Chiu is a 64 y.o. female who presents for Follow-up (CT).    Tired and hurts every wear.  Patient states she just hurts all over.  Feels like she is having a lot of arthritis issues.  The only thing that is really helped has been the meloxicam she would like to continue on this.  Patient had no fever no chills no night sweats she has not not noticed any enlargement of lymph nodes.  She had no nausea no vomiting.    She had had some fatigue she is frustrated with her weight gain.  Said difficult time losing weight.         patient presents for follow-up on her CT scan    Review of Systems   Constitutional:  Negative for activity change, chills, fatigue and fever.   HENT:  Negative for congestion and dental problem.    Eyes:  Negative for redness and itching.   Respiratory:  Negative for cough, chest tightness and wheezing.    Endocrine: Negative for cold intolerance.       Objective   BP (!) 128/92   Pulse 92   Temp 36.1 °C (97 °F)   Ht 1.651 m (5' 5\")   Wt 122 kg (270 lb)   SpO2 95%   BMI 44.93 kg/m²   BSA Body surface area is 2.37 meters squared.      Physical Exam  Constitutional:       Appearance: Normal appearance.   HENT:      Head: Normocephalic and atraumatic.      Right Ear: Tympanic membrane normal.   Cardiovascular:      Rate and Rhythm: Normal rate and regular rhythm.      Pulses: Normal pulses.   Pulmonary:      Effort: No respiratory distress.      Breath sounds: No wheezing.   Abdominal:      General: Abdomen is flat.   Neurological:      Mental Status: She is alert.     No significant lymphadenopathy noted.  No axillary or supraclavicular lymphadenopathy.  No inguinal adenopathy.    Some pain and discomfort with testing range of motion of the joints  Lab on 02/06/2024   Component Date Value Ref Range Status    Glucose 02/06/2024 100 (H)  74 - 99 mg/dL Final    Sodium 02/06/2024 140  136 - 145 mmol/L Final    Potassium 02/06/2024 4.1  3.5 - 5.3 mmol/L Final "    Chloride 02/06/2024 103  98 - 107 mmol/L Final    Bicarbonate 02/06/2024 28  21 - 32 mmol/L Final    Anion Gap 02/06/2024 13  10 - 20 mmol/L Final    Urea Nitrogen 02/06/2024 21  6 - 23 mg/dL Final    Creatinine 02/06/2024 0.80  0.50 - 1.05 mg/dL Final    eGFR 02/06/2024 84  >60 mL/min/1.73m*2 Final    Calculations of estimated GFR are performed using the 2021 CKD-EPI Study Refit equation without the race variable for the IDMS-Traceable creatinine methods.  https://jasn.asnjournals.org/content/early/2021/09/22/ASN.2944075245    Calcium 02/06/2024 9.4  8.6 - 10.6 mg/dL Final     Current Outpatient Medications on File Prior to Visit   Medication Sig Dispense Refill    albuterol 90 mcg/actuation inhaler INHALE 1 TO 2 PUFFS BY MOUTH EVERY 4 TO 6 HOURS AS NEEDED 6.7 g 0    lisinopril 10 mg tablet TAKE 1 TABLET BY MOUTH EVERY DAY 90 tablet 0    meloxicam (Mobic) 15 mg tablet TAKE 1 TABLET BY MOUTH EVERY DAY 90 tablet 0    predniSONE (Deltasone) 50 mg tablet Please take 50 mg 13 hours before procedure and then 7 hours before procedure and then 1 hour before procedure 3 tablet 0    rizatriptan (Maxalt) 10 mg tablet TAKE 1 TABLET AT ONSET - MAY REPEAT IN 2 HOURS IF NEEDED - MAX OF 3 TABLETS PER DAY 12 tablet 1    [DISCONTINUED] albuterol 90 mcg/actuation inhaler Inhale 2 puffs every 4 hours if needed.       No current facility-administered medications on file prior to visit.     No images are attached to the encounter.            Assessment/Plan   Problem List Items Addressed This Visit             ICD-10-CM    Primary osteoarthritis of both knees M17.0    Right hip pain M25.551     Evaluating for arthralgias lab studies are going to be performed.  Sed rate CRP LEFTY rheumatoid factor being performed         Lymphadenopathy R59.1     Borderline lymphadenopathy going to have you see the hematology specialist.    Sed rate C-reactive protein go to be performed CMP CBC LDH going to be performed.  Because of the arthralgias and  a rheumatoid factor being performed         Arthralgia - Primary M25.50    Obesity E66.9

## 2024-02-28 ENCOUNTER — LAB (OUTPATIENT)
Dept: LAB | Facility: LAB | Age: 64
End: 2024-02-28
Payer: COMMERCIAL

## 2024-02-28 DIAGNOSIS — M25.50 ARTHRALGIA, UNSPECIFIED JOINT: ICD-10-CM

## 2024-02-28 DIAGNOSIS — R71.8 ELEVATED MCV: ICD-10-CM

## 2024-02-28 DIAGNOSIS — R59.1 LYMPHADENOPATHY: ICD-10-CM

## 2024-02-28 PROCEDURE — 85025 COMPLETE CBC W/AUTO DIFF WBC: CPT

## 2024-02-28 PROCEDURE — 36415 COLL VENOUS BLD VENIPUNCTURE: CPT

## 2024-02-28 PROCEDURE — 86140 C-REACTIVE PROTEIN: CPT

## 2024-02-28 PROCEDURE — 86431 RHEUMATOID FACTOR QUANT: CPT

## 2024-02-28 PROCEDURE — 80053 COMPREHEN METABOLIC PANEL: CPT

## 2024-02-28 PROCEDURE — 82607 VITAMIN B-12: CPT

## 2024-02-28 PROCEDURE — 83615 LACTATE (LD) (LDH) ENZYME: CPT

## 2024-02-28 PROCEDURE — 85652 RBC SED RATE AUTOMATED: CPT

## 2024-02-28 PROCEDURE — 86038 ANTINUCLEAR ANTIBODIES: CPT

## 2024-02-29 LAB
ALBUMIN SERPL BCP-MCNC: 4.2 G/DL (ref 3.4–5)
ALP SERPL-CCNC: 91 U/L (ref 33–136)
ALT SERPL W P-5'-P-CCNC: 23 U/L (ref 7–45)
ANION GAP SERPL CALC-SCNC: 18 MMOL/L (ref 10–20)
AST SERPL W P-5'-P-CCNC: 17 U/L (ref 9–39)
BASOPHILS # BLD AUTO: 0.06 X10*3/UL (ref 0–0.1)
BASOPHILS NFR BLD AUTO: 1 %
BILIRUB SERPL-MCNC: 0.4 MG/DL (ref 0–1.2)
BUN SERPL-MCNC: 16 MG/DL (ref 6–23)
CALCIUM SERPL-MCNC: 9.5 MG/DL (ref 8.6–10.6)
CHLORIDE SERPL-SCNC: 101 MMOL/L (ref 98–107)
CO2 SERPL-SCNC: 22 MMOL/L (ref 21–32)
CREAT SERPL-MCNC: 0.7 MG/DL (ref 0.5–1.05)
CRP SERPL-MCNC: 1.26 MG/DL
EGFRCR SERPLBLD CKD-EPI 2021: >90 ML/MIN/1.73M*2
EOSINOPHIL # BLD AUTO: 0.35 X10*3/UL (ref 0–0.7)
EOSINOPHIL NFR BLD AUTO: 5.6 %
ERYTHROCYTE [DISTWIDTH] IN BLOOD BY AUTOMATED COUNT: 15.2 % (ref 11.5–14.5)
ERYTHROCYTE [SEDIMENTATION RATE] IN BLOOD BY WESTERGREN METHOD: 24 MM/H (ref 0–30)
GLUCOSE SERPL-MCNC: 98 MG/DL (ref 74–99)
HCT VFR BLD AUTO: 40 % (ref 36–46)
HGB BLD-MCNC: 12.9 G/DL (ref 12–16)
IMM GRANULOCYTES # BLD AUTO: 0.04 X10*3/UL (ref 0–0.7)
IMM GRANULOCYTES NFR BLD AUTO: 0.6 % (ref 0–0.9)
LDH SERPL L TO P-CCNC: 149 U/L (ref 84–246)
LYMPHOCYTES # BLD AUTO: 0.84 X10*3/UL (ref 1.2–4.8)
LYMPHOCYTES NFR BLD AUTO: 13.4 %
MCH RBC QN AUTO: 33.8 PG (ref 26–34)
MCHC RBC AUTO-ENTMCNC: 32.3 G/DL (ref 32–36)
MCV RBC AUTO: 105 FL (ref 80–100)
MONOCYTES # BLD AUTO: 0.53 X10*3/UL (ref 0.1–1)
MONOCYTES NFR BLD AUTO: 8.5 %
NEUTROPHILS # BLD AUTO: 4.44 X10*3/UL (ref 1.2–7.7)
NEUTROPHILS NFR BLD AUTO: 70.9 %
NRBC BLD-RTO: 0 /100 WBCS (ref 0–0)
PLATELET # BLD AUTO: 223 X10*3/UL (ref 150–450)
POTASSIUM SERPL-SCNC: 4.4 MMOL/L (ref 3.5–5.3)
PROT SERPL-MCNC: 6.6 G/DL (ref 6.4–8.2)
RBC # BLD AUTO: 3.82 X10*6/UL (ref 4–5.2)
RHEUMATOID FACT SER NEPH-ACNC: <10 IU/ML (ref 0–15)
SODIUM SERPL-SCNC: 137 MMOL/L (ref 136–145)
VIT B12 SERPL-MCNC: 344 PG/ML (ref 211–911)
WBC # BLD AUTO: 6.3 X10*3/UL (ref 4.4–11.3)

## 2024-03-01 LAB — ANA SER QL HEP2 SUBST: NEGATIVE

## 2024-03-02 DIAGNOSIS — S76.011A STRAIN OF MUSCLE OF RIGHT HIP, INITIAL ENCOUNTER: ICD-10-CM

## 2024-03-04 RX ORDER — CYCLOBENZAPRINE HCL 5 MG
5 TABLET ORAL NIGHTLY PRN
Qty: 30 TABLET | Refills: 0 | Status: SHIPPED | OUTPATIENT
Start: 2024-03-04 | End: 2024-04-17

## 2024-03-20 DIAGNOSIS — G43.909 MIGRAINE, UNSPECIFIED, NOT INTRACTABLE, WITHOUT STATUS MIGRAINOSUS: ICD-10-CM

## 2024-03-20 DIAGNOSIS — J40 BRONCHITIS, NOT SPECIFIED AS ACUTE OR CHRONIC: ICD-10-CM

## 2024-03-20 RX ORDER — RIZATRIPTAN BENZOATE 10 MG/1
TABLET ORAL
Qty: 12 TABLET | Refills: 1 | Status: SHIPPED | OUTPATIENT
Start: 2024-03-20

## 2024-03-20 RX ORDER — ALBUTEROL SULFATE 90 UG/1
AEROSOL, METERED RESPIRATORY (INHALATION)
Qty: 18 G | Refills: 2 | Status: SHIPPED | OUTPATIENT
Start: 2024-03-20

## 2024-03-29 DIAGNOSIS — I10 ESSENTIAL (PRIMARY) HYPERTENSION: ICD-10-CM

## 2024-03-29 RX ORDER — LISINOPRIL 10 MG/1
TABLET ORAL
Qty: 90 TABLET | Refills: 0 | Status: SHIPPED | OUTPATIENT
Start: 2024-03-29

## 2024-04-16 DIAGNOSIS — S76.011A STRAIN OF MUSCLE OF RIGHT HIP, INITIAL ENCOUNTER: ICD-10-CM

## 2024-04-17 RX ORDER — CYCLOBENZAPRINE HCL 5 MG
5 TABLET ORAL NIGHTLY PRN
Qty: 30 TABLET | Refills: 0 | Status: SHIPPED | OUTPATIENT
Start: 2024-04-17 | End: 2024-05-17

## 2024-04-18 DIAGNOSIS — M25.551 RIGHT HIP PAIN: ICD-10-CM

## 2024-04-18 RX ORDER — MELOXICAM 15 MG/1
15 TABLET ORAL DAILY
Qty: 90 TABLET | Refills: 0 | Status: SHIPPED | OUTPATIENT
Start: 2024-04-18

## 2024-06-25 DIAGNOSIS — I10 ESSENTIAL (PRIMARY) HYPERTENSION: ICD-10-CM

## 2024-06-25 RX ORDER — LISINOPRIL 10 MG/1
TABLET ORAL
Qty: 90 TABLET | Refills: 0 | Status: SHIPPED | OUTPATIENT
Start: 2024-06-25

## 2024-07-14 DIAGNOSIS — M25.551 RIGHT HIP PAIN: ICD-10-CM

## 2024-07-15 RX ORDER — MELOXICAM 15 MG/1
15 TABLET ORAL DAILY
Qty: 90 TABLET | Refills: 0 | Status: SHIPPED | OUTPATIENT
Start: 2024-07-15

## 2024-08-01 ENCOUNTER — APPOINTMENT (OUTPATIENT)
Dept: PRIMARY CARE | Facility: CLINIC | Age: 64
End: 2024-08-01
Payer: COMMERCIAL

## 2024-09-03 ENCOUNTER — APPOINTMENT (OUTPATIENT)
Dept: PRIMARY CARE | Facility: CLINIC | Age: 64
End: 2024-09-03
Payer: COMMERCIAL

## 2024-09-06 DIAGNOSIS — S76.011A STRAIN OF MUSCLE OF RIGHT HIP, INITIAL ENCOUNTER: ICD-10-CM

## 2024-09-09 ENCOUNTER — APPOINTMENT (OUTPATIENT)
Dept: PRIMARY CARE | Facility: CLINIC | Age: 64
End: 2024-09-09
Payer: COMMERCIAL

## 2024-09-09 VITALS
HEIGHT: 65 IN | DIASTOLIC BLOOD PRESSURE: 83 MMHG | HEART RATE: 92 BPM | OXYGEN SATURATION: 95 % | WEIGHT: 266 LBS | SYSTOLIC BLOOD PRESSURE: 122 MMHG | BODY MASS INDEX: 44.32 KG/M2 | TEMPERATURE: 97.7 F

## 2024-09-09 DIAGNOSIS — E66.01 CLASS 3 SEVERE OBESITY WITH SERIOUS COMORBIDITY AND BODY MASS INDEX (BMI) OF 40.0 TO 44.9 IN ADULT, UNSPECIFIED OBESITY TYPE (MULTI): ICD-10-CM

## 2024-09-09 DIAGNOSIS — I10 HYPERTENSION, UNSPECIFIED TYPE: ICD-10-CM

## 2024-09-09 DIAGNOSIS — Z01.818 PREOP EXAMINATION: ICD-10-CM

## 2024-09-09 DIAGNOSIS — M17.0 PRIMARY OSTEOARTHRITIS OF BOTH KNEES: Primary | ICD-10-CM

## 2024-09-09 PROBLEM — G56.00 CARPAL TUNNEL SYNDROME: Status: RESOLVED | Noted: 2023-03-29 | Resolved: 2024-09-09

## 2024-09-09 PROBLEM — M25.551 RIGHT HIP PAIN: Status: RESOLVED | Noted: 2023-06-21 | Resolved: 2024-09-09

## 2024-09-09 PROBLEM — G62.9 PERIPHERAL NEUROPATHY: Status: RESOLVED | Noted: 2023-03-29 | Resolved: 2024-09-09

## 2024-09-09 PROCEDURE — 3079F DIAST BP 80-89 MM HG: CPT | Performed by: FAMILY MEDICINE

## 2024-09-09 PROCEDURE — 99214 OFFICE O/P EST MOD 30 MIN: CPT | Performed by: FAMILY MEDICINE

## 2024-09-09 PROCEDURE — 1036F TOBACCO NON-USER: CPT | Performed by: FAMILY MEDICINE

## 2024-09-09 PROCEDURE — 3074F SYST BP LT 130 MM HG: CPT | Performed by: FAMILY MEDICINE

## 2024-09-09 PROCEDURE — 3008F BODY MASS INDEX DOCD: CPT | Performed by: FAMILY MEDICINE

## 2024-09-09 RX ORDER — CYCLOBENZAPRINE HCL 5 MG
5 TABLET ORAL 3 TIMES DAILY PRN
COMMUNITY
End: 2024-09-09 | Stop reason: SDUPTHER

## 2024-09-09 RX ORDER — CYCLOBENZAPRINE HCL 5 MG
5 TABLET ORAL 3 TIMES DAILY PRN
Qty: 30 TABLET | Refills: 1 | Status: SHIPPED | OUTPATIENT
Start: 2024-09-09

## 2024-09-09 ASSESSMENT — ENCOUNTER SYMPTOMS
CHOKING: 0
COUGH: 0
CHILLS: 0
ABDOMINAL DISTENTION: 0
ARTHRALGIAS: 1
LOSS OF SENSATION IN FEET: 0
ACTIVITY CHANGE: 0
CHEST TIGHTNESS: 0
OCCASIONAL FEELINGS OF UNSTEADINESS: 0
PALPITATIONS: 0
DEPRESSION: 0
APNEA: 0
ABDOMINAL PAIN: 0

## 2024-09-09 ASSESSMENT — PATIENT HEALTH QUESTIONNAIRE - PHQ9
1. LITTLE INTEREST OR PLEASURE IN DOING THINGS: NOT AT ALL
2. FEELING DOWN, DEPRESSED OR HOPELESS: NOT AT ALL
SUM OF ALL RESPONSES TO PHQ9 QUESTIONS 1 AND 2: 0
10. IF YOU CHECKED OFF ANY PROBLEMS, HOW DIFFICULT HAVE THESE PROBLEMS MADE IT FOR YOU TO DO YOUR WORK, TAKE CARE OF THINGS AT HOME, OR GET ALONG WITH OTHER PEOPLE: NOT DIFFICULT AT ALL

## 2024-09-09 NOTE — PROGRESS NOTES
"Subjective   Patient ID: Courtney Chiu is a 64 y.o. female who presents for Pre-op Exam (Left Total Knee Replacement by Dr. Lam on 9/26/24).    L knee replacement.  Patient going to have left knee replacement.    Patient has had no chest pain or shortness of breath no dizziness no lightheadedness no troubles with abdominal pain or discomfort no fever no chills no night sweats.    Patient has had some nausea after having anesthesia.    Last time she had it it went very well.  She has had no postop DVT.     patient having left total knee replacement    Review of Systems   Constitutional:  Negative for activity change and chills.   HENT: Negative.     Respiratory:  Negative for apnea, cough, choking and chest tightness.    Cardiovascular:  Negative for chest pain, palpitations and leg swelling.   Gastrointestinal:  Negative for abdominal distention and abdominal pain.   Musculoskeletal:  Positive for arthralgias.       Objective   /83   Pulse 92   Temp 36.5 °C (97.7 °F)   Ht 1.651 m (5' 5\")   Wt 121 kg (266 lb)   SpO2 95%   BMI 44.26 kg/m²   BSA Body surface area is 2.36 meters squared.      Physical Exam  Constitutional:       Appearance: Normal appearance. She is obese.   HENT:      Head: Normocephalic.      Right Ear: Tympanic membrane normal.   Cardiovascular:      Rate and Rhythm: Normal rate and regular rhythm.      Pulses: Normal pulses.   Pulmonary:      Effort: Pulmonary effort is normal.   Abdominal:      General: Abdomen is flat.      Palpations: Abdomen is soft.   Neurological:      Mental Status: She is alert.     Dorsalis pedis pulses are strong.  Crepitus in the left knee noted.  Lab on 02/28/2024   Component Date Value Ref Range Status    Glucose 02/28/2024 98  74 - 99 mg/dL Final    Sodium 02/28/2024 137  136 - 145 mmol/L Final    Potassium 02/28/2024 4.4  3.5 - 5.3 mmol/L Final    Chloride 02/28/2024 101  98 - 107 mmol/L Final    Bicarbonate 02/28/2024 22  21 - 32 mmol/L Final    " Anion Gap 02/28/2024 18  10 - 20 mmol/L Final    Urea Nitrogen 02/28/2024 16  6 - 23 mg/dL Final    Creatinine 02/28/2024 0.70  0.50 - 1.05 mg/dL Final    eGFR 02/28/2024 >90  >60 mL/min/1.73m*2 Final    Calculations of estimated GFR are performed using the 2021 CKD-EPI Study Refit equation without the race variable for the IDMS-Traceable creatinine methods.  https://jasn.asnjournals.org/content/early/2021/09/22/ASN.8520200744    Calcium 02/28/2024 9.5  8.6 - 10.6 mg/dL Final    Albumin 02/28/2024 4.2  3.4 - 5.0 g/dL Final    Alkaline Phosphatase 02/28/2024 91  33 - 136 U/L Final    Total Protein 02/28/2024 6.6  6.4 - 8.2 g/dL Final    AST 02/28/2024 17  9 - 39 U/L Final    Bilirubin, Total 02/28/2024 0.4  0.0 - 1.2 mg/dL Final    ALT 02/28/2024 23  7 - 45 U/L Final    Patients treated with Sulfasalazine may generate falsely decreased results for ALT.    WBC 02/28/2024 6.3  4.4 - 11.3 x10*3/uL Final    nRBC 02/28/2024 0.0  0.0 - 0.0 /100 WBCs Final    RBC 02/28/2024 3.82 (L)  4.00 - 5.20 x10*6/uL Final    Hemoglobin 02/28/2024 12.9  12.0 - 16.0 g/dL Final    Hematocrit 02/28/2024 40.0  36.0 - 46.0 % Final    MCV 02/28/2024 105 (H)  80 - 100 fL Final    MCH 02/28/2024 33.8  26.0 - 34.0 pg Final    MCHC 02/28/2024 32.3  32.0 - 36.0 g/dL Final    RDW 02/28/2024 15.2 (H)  11.5 - 14.5 % Final    Platelets 02/28/2024 223  150 - 450 x10*3/uL Final    Neutrophils % 02/28/2024 70.9  40.0 - 80.0 % Final    Immature Granulocytes %, Automated 02/28/2024 0.6  0.0 - 0.9 % Final    Immature Granulocyte Count (IG) includes promyelocytes, myelocytes and metamyelocytes but does not include bands. Percent differential counts (%) should be interpreted in the context of the absolute cell counts (cells/UL).    Lymphocytes % 02/28/2024 13.4  13.0 - 44.0 % Final    Monocytes % 02/28/2024 8.5  2.0 - 10.0 % Final    Eosinophils % 02/28/2024 5.6  0.0 - 6.0 % Final    Basophils % 02/28/2024 1.0  0.0 - 2.0 % Final    Neutrophils Absolute  02/28/2024 4.44  1.20 - 7.70 x10*3/uL Final    Percent differential counts (%) should be interpreted in the context of the absolute cell counts (cells/uL).    Immature Granulocytes Absolute, Au* 02/28/2024 0.04  0.00 - 0.70 x10*3/uL Final    Lymphocytes Absolute 02/28/2024 0.84 (L)  1.20 - 4.80 x10*3/uL Final    Monocytes Absolute 02/28/2024 0.53  0.10 - 1.00 x10*3/uL Final    Eosinophils Absolute 02/28/2024 0.35  0.00 - 0.70 x10*3/uL Final    Basophils Absolute 02/28/2024 0.06  0.00 - 0.10 x10*3/uL Final    LDH 02/28/2024 149  84 - 246 U/L Final    Sedimentation Rate 02/28/2024 24  0 - 30 mm/h Final    C-Reactive Protein 02/28/2024 1.26 (H)  <1.00 mg/dL Final    LEFTY 02/28/2024 Negative  Negative Final    The Antinuclear Antibody (LEFTY) test was performed using  indirect immunofluorescence assay with HEp-2 cells slide.    Rheumatoid Factor 02/28/2024 <10  0 - 15 IU/mL Final    Vitamin B12 02/28/2024 344  211 - 911 pg/mL Final   Lab on 02/06/2024   Component Date Value Ref Range Status    Glucose 02/06/2024 100 (H)  74 - 99 mg/dL Final    Sodium 02/06/2024 140  136 - 145 mmol/L Final    Potassium 02/06/2024 4.1  3.5 - 5.3 mmol/L Final    Chloride 02/06/2024 103  98 - 107 mmol/L Final    Bicarbonate 02/06/2024 28  21 - 32 mmol/L Final    Anion Gap 02/06/2024 13  10 - 20 mmol/L Final    Urea Nitrogen 02/06/2024 21  6 - 23 mg/dL Final    Creatinine 02/06/2024 0.80  0.50 - 1.05 mg/dL Final    eGFR 02/06/2024 84  >60 mL/min/1.73m*2 Final    Calculations of estimated GFR are performed using the 2021 CKD-EPI Study Refit equation without the race variable for the IDMS-Traceable creatinine methods.  https://jasn.asnjournals.org/content/early/2021/09/22/ASN.3847341844    Calcium 02/06/2024 9.4  8.6 - 10.6 mg/dL Final     Current Outpatient Medications on File Prior to Visit   Medication Sig Dispense Refill    albuterol 90 mcg/actuation inhaler INHALE 1 TO 2 PUFFS BY MOUTH EVERY 4 TO 6 HOURS AS NEEDED 18 g 2    lisinopril 10 mg  tablet TAKE 1 TABLET BY MOUTH EVERY DAY 90 tablet 0    meloxicam (Mobic) 15 mg tablet TAKE 1 TABLET BY MOUTH EVERY DAY 90 tablet 0    rizatriptan (Maxalt) 10 mg tablet TAKE 1 TABLET AT ONSET - MAY REPEAT IN 2 HOURS IF NEEDED - MAX OF 3 TABLETS PER DAY 12 tablet 1    [DISCONTINUED] cyclobenzaprine (Flexeril) 5 mg tablet Take 1 tablet (5 mg) by mouth 3 times a day as needed for muscle spasms.      predniSONE (Deltasone) 50 mg tablet Please take 50 mg 13 hours before procedure and then 7 hours before procedure and then 1 hour before procedure 3 tablet 0     No current facility-administered medications on file prior to visit.     No images are attached to the encounter.            Assessment/Plan   Problem List Items Addressed This Visit             ICD-10-CM    Hypertension I10     Blood pressure under good control         Primary osteoarthritis of both knees - Primary M17.0     Having total knee replacement performed         Obesity E66.9     Please continue work toward weight loss given 1500-calorie diet          Other Visit Diagnoses         Codes    Preop examination     Z01.818    Relevant Medications    cyclobenzaprine (Flexeril) 5 mg tablet

## 2024-09-09 NOTE — PATIENT INSTRUCTIONS
Please have preop testing performed.    Medications reviewed and reconciled.    Will review the labs and EKG.    Anticipate clearance for surgery.  Please continue to work toward weight loss.

## 2024-09-19 RX ORDER — MUPIROCIN 20 MG/G
OINTMENT TOPICAL 2 TIMES DAILY
COMMUNITY
Start: 2024-09-12 | End: 2024-09-25

## 2024-09-19 RX ORDER — CYCLOBENZAPRINE HCL 5 MG
5 TABLET ORAL NIGHTLY PRN
Qty: 30 TABLET | Refills: 0 | Status: SHIPPED | OUTPATIENT
Start: 2024-09-19 | End: 2024-10-19

## 2024-09-20 ENCOUNTER — CLINICAL SUPPORT (OUTPATIENT)
Dept: PRIMARY CARE | Facility: CLINIC | Age: 64
End: 2024-09-20
Payer: COMMERCIAL

## 2024-09-20 DIAGNOSIS — Z01.818 PREOP TESTING: ICD-10-CM

## 2024-09-20 NOTE — PROGRESS NOTES
Pt presents for EKG per Dr Tenorio d/t upcoming knee surgery. EKG done & reviewed by ; he said, it looks good & he will get her cleared for surgery.

## 2024-09-21 DIAGNOSIS — I10 ESSENTIAL (PRIMARY) HYPERTENSION: ICD-10-CM

## 2024-09-23 RX ORDER — LISINOPRIL 10 MG/1
TABLET ORAL
Qty: 90 TABLET | Refills: 0 | Status: SHIPPED | OUTPATIENT
Start: 2024-09-23

## 2024-10-21 DIAGNOSIS — M25.551 RIGHT HIP PAIN: ICD-10-CM

## 2024-10-21 RX ORDER — MELOXICAM 15 MG/1
15 TABLET ORAL DAILY
Qty: 90 TABLET | Refills: 0 | Status: SHIPPED | OUTPATIENT
Start: 2024-10-21

## 2024-12-19 ENCOUNTER — TELEPHONE (OUTPATIENT)
Dept: PRIMARY CARE | Facility: CLINIC | Age: 64
End: 2024-12-19

## 2024-12-19 DIAGNOSIS — Z79.2 NEED FOR ANTIBIOTIC PROPHYLAXIS FOR DENTAL PROCEDURE: Primary | ICD-10-CM

## 2024-12-19 RX ORDER — AMOXICILLIN 500 MG/1
2000 TABLET, FILM COATED ORAL ONCE
Qty: 4 TABLET | Refills: 0 | Status: SHIPPED | OUTPATIENT
Start: 2024-12-19 | End: 2024-12-19

## 2024-12-19 NOTE — TELEPHONE ENCOUNTER
Patient called stating that she will need an Rx for dental work this Saturday 12/21/24 can you send this over to the pharmacy for her

## 2024-12-22 DIAGNOSIS — I10 ESSENTIAL (PRIMARY) HYPERTENSION: ICD-10-CM

## 2024-12-23 RX ORDER — LISINOPRIL 10 MG/1
TABLET ORAL
Qty: 90 TABLET | Refills: 0 | Status: SHIPPED | OUTPATIENT
Start: 2024-12-23

## 2025-01-20 DIAGNOSIS — M25.551 RIGHT HIP PAIN: ICD-10-CM

## 2025-01-20 RX ORDER — MELOXICAM 15 MG/1
15 TABLET ORAL DAILY
Qty: 90 TABLET | Refills: 1 | Status: SHIPPED | OUTPATIENT
Start: 2025-01-20

## 2025-03-20 DIAGNOSIS — G43.909 MIGRAINE, UNSPECIFIED, NOT INTRACTABLE, WITHOUT STATUS MIGRAINOSUS: ICD-10-CM

## 2025-03-20 DIAGNOSIS — Z01.818 PREOP EXAMINATION: ICD-10-CM

## 2025-03-20 RX ORDER — RIZATRIPTAN BENZOATE 10 MG/1
TABLET ORAL
Qty: 12 TABLET | Refills: 1 | OUTPATIENT
Start: 2025-03-20

## 2025-03-20 RX ORDER — CYCLOBENZAPRINE HCL 5 MG
5 TABLET ORAL 3 TIMES DAILY PRN
Qty: 30 TABLET | Refills: 1 | OUTPATIENT
Start: 2025-03-20

## 2025-03-21 RX ORDER — RIZATRIPTAN BENZOATE 10 MG/1
10 TABLET ORAL ONCE AS NEEDED
Qty: 12 TABLET | Refills: 1 | Status: SHIPPED | OUTPATIENT
Start: 2025-03-21

## 2025-03-27 DIAGNOSIS — I10 ESSENTIAL (PRIMARY) HYPERTENSION: ICD-10-CM

## 2025-03-27 RX ORDER — LISINOPRIL 10 MG/1
TABLET ORAL
Qty: 90 TABLET | Refills: 0 | Status: SHIPPED | OUTPATIENT
Start: 2025-03-27

## 2025-05-23 DIAGNOSIS — G43.909 MIGRAINE, UNSPECIFIED, NOT INTRACTABLE, WITHOUT STATUS MIGRAINOSUS: ICD-10-CM

## 2025-05-23 RX ORDER — RIZATRIPTAN BENZOATE 10 MG/1
TABLET ORAL
Qty: 12 TABLET | Refills: 1 | Status: SHIPPED | OUTPATIENT
Start: 2025-05-23

## 2025-06-27 DIAGNOSIS — I10 ESSENTIAL (PRIMARY) HYPERTENSION: ICD-10-CM

## 2025-06-27 RX ORDER — LISINOPRIL 10 MG/1
10 TABLET ORAL DAILY
Qty: 90 TABLET | Refills: 0 | Status: SHIPPED | OUTPATIENT
Start: 2025-06-27

## 2025-07-22 DIAGNOSIS — M25.551 RIGHT HIP PAIN: ICD-10-CM

## 2025-07-22 RX ORDER — MELOXICAM 15 MG/1
15 TABLET ORAL DAILY
Qty: 90 TABLET | Refills: 1 | Status: SHIPPED | OUTPATIENT
Start: 2025-07-22